# Patient Record
Sex: FEMALE | URBAN - METROPOLITAN AREA
[De-identification: names, ages, dates, MRNs, and addresses within clinical notes are randomized per-mention and may not be internally consistent; named-entity substitution may affect disease eponyms.]

---

## 2023-01-17 ENCOUNTER — WEB ENCOUNTER (OUTPATIENT)
Dept: URBAN - METROPOLITAN AREA CLINIC 27 | Facility: CLINIC | Age: 48
End: 2023-01-17

## 2023-01-18 ENCOUNTER — OFFICE VISIT (OUTPATIENT)
Dept: URBAN - METROPOLITAN AREA CLINIC 27 | Facility: CLINIC | Age: 48
End: 2023-01-18
Payer: COMMERCIAL

## 2023-01-18 ENCOUNTER — DASHBOARD ENCOUNTERS (OUTPATIENT)
Age: 48
End: 2023-01-18

## 2023-01-18 VITALS
BODY MASS INDEX: 23.05 KG/M2 | HEIGHT: 64 IN | RESPIRATION RATE: 17 BRPM | SYSTOLIC BLOOD PRESSURE: 111 MMHG | DIASTOLIC BLOOD PRESSURE: 70 MMHG | WEIGHT: 135 LBS | HEART RATE: 57 BPM

## 2023-01-18 DIAGNOSIS — Z12.11 SCREENING FOR COLON CANCER: ICD-10-CM

## 2023-01-18 PROCEDURE — 99202 OFFICE O/P NEW SF 15 MIN: CPT | Performed by: INTERNAL MEDICINE

## 2023-01-18 PROCEDURE — 99243 OFF/OP CNSLTJ NEW/EST LOW 30: CPT | Performed by: INTERNAL MEDICINE

## 2023-01-18 NOTE — HPI-TODAY'S VISIT:
Ms. Caban is a 47-year-old female seen at the request of Dr. Wanda Slaughter for initial CRC screening. A copy of this document will be sent to the referring physician.  The patient does not have a family history of CRC or colon polyps. Patient denies nausea, vomiting, abdominal pain, heartburn, diarrhea, melena, hematochezia, anemia and weight loss.

## 2023-02-06 ENCOUNTER — WEB ENCOUNTER (OUTPATIENT)
Dept: URBAN - METROPOLITAN AREA SURGERY CENTER 7 | Facility: SURGERY CENTER | Age: 48
End: 2023-02-06

## 2023-02-06 ENCOUNTER — CLAIMS CREATED FROM THE CLAIM WINDOW (OUTPATIENT)
Dept: URBAN - METROPOLITAN AREA CLINIC 4 | Facility: CLINIC | Age: 48
End: 2023-02-06
Payer: COMMERCIAL

## 2023-02-06 ENCOUNTER — OFFICE VISIT (OUTPATIENT)
Dept: URBAN - METROPOLITAN AREA SURGERY CENTER 7 | Facility: SURGERY CENTER | Age: 48
End: 2023-02-06
Payer: COMMERCIAL

## 2023-02-06 DIAGNOSIS — Z12.11 COLON CANCER SCREENING: ICD-10-CM

## 2023-02-06 DIAGNOSIS — D12.3 ADENOMA OF TRANSVERSE COLON: ICD-10-CM

## 2023-02-06 DIAGNOSIS — D12.3 BENIGN NEOPLASM OF TRANSVERSE COLON: ICD-10-CM

## 2023-02-06 PROCEDURE — 45385 COLONOSCOPY W/LESION REMOVAL: CPT | Performed by: INTERNAL MEDICINE

## 2023-02-06 PROCEDURE — 88305 TISSUE EXAM BY PATHOLOGIST: CPT | Performed by: PATHOLOGY

## 2023-02-06 PROCEDURE — G8907 PT DOC NO EVENTS ON DISCHARG: HCPCS | Performed by: INTERNAL MEDICINE
